# Patient Record
Sex: FEMALE | Race: WHITE | NOT HISPANIC OR LATINO | Employment: FULL TIME | URBAN - NONMETROPOLITAN AREA
[De-identification: names, ages, dates, MRNs, and addresses within clinical notes are randomized per-mention and may not be internally consistent; named-entity substitution may affect disease eponyms.]

---

## 2018-06-29 ENCOUNTER — OFFICE VISIT CONVERTED (OUTPATIENT)
Dept: FAMILY MEDICINE CLINIC | Age: 43
End: 2018-06-29
Attending: NURSE PRACTITIONER

## 2018-09-28 ENCOUNTER — OFFICE VISIT CONVERTED (OUTPATIENT)
Dept: FAMILY MEDICINE CLINIC | Age: 43
End: 2018-09-28
Attending: NURSE PRACTITIONER

## 2021-05-18 NOTE — PROGRESS NOTES
Jackie Dooley 1975     Office/Outpatient Visit    Visit Date:  04:17 pm    Provider: Barbara Martell N.P. (Assistant: Sharon Altamirano MA)    Location: [facility]        Electronically signed by Barbara Martell N.P. on  2018 03:24:13 PM                             SUBJECTIVE:        CC:     Ms. Dooley is a 42 year old White female.  patient is here for blood work and check up;         HPI:         Ms. Dooley complains of fatigue.  States daytime fatigue. Cannot identify a cause.          Dx with insect bite to face; pt states insect bite to her L cheek. It has become red and slightly swollen. She has used otc meds without much relief.      ROS:     CONSTITUTIONAL:  Positive for fatigue.      EYES:  Negative for blurred vision.      CARDIOVASCULAR:  Negative for chest pain, orthopnea, paroxysmal nocturnal dyspnea and pedal edema.      RESPIRATORY:  Negative for dyspnea.      GASTROINTESTINAL:  Negative for abdominal pain, constipation, diarrhea, nausea and vomiting.      NEUROLOGICAL:  Negative for dizziness, headaches, paresthesias, and weakness.      PSYCHIATRIC:  Negative for anxiety, depression, and sleep disturbances.          PMH/FMH/SH:     Last Reviewed on 2017 10:37 AM by July Moura    Past Medical History:     UNREMARKABLE         GYNECOLOGICAL HISTORY:        Contraception: S/P tubal ligation;         PREVENTIVE HEALTH MAINTENANCE             EYE EXAM: was last done  normal 20/20 eye care             PAST MEDICAL HISTORY             CURRENT MEDICAL PROVIDERS:    surgeon dr cheney         Surgical History:          section: X 3;     removal right shoulder lipoma 2017;         Family History:     Unremarkable         Social History:     Occupation: OTHER (enter)warehouse;     Marital Status:      Children: 2 children         Tobacco/Alcohol/Supplements:     Last Reviewed on 2017 03:55 PM by Mariaelena Martinez     Tobacco: She has a past history of cigarette smoking; quit date:  1,21,13.          Alcohol:  Does not drink alcohol and never has.              Current Problems:     Lipoma, other specified site         Immunizations:     None        Allergies:     Last Reviewed on 1/05/2017 03:55 PM by Mariaelena Martinez      No Known Drug Allergies.         Current Medications:     Last Reviewed on 1/05/2017 03:56 PM by Mariaelena Martinez    None        OBJECTIVE:        Vitals:         Current: 6/29/2018 4:19:33 PM    Ht:  5 ft, 10 in;  Wt: 261.2 lbs;  BMI: 37.5    T: 99.6 F (oral);  BP: 115/72 mm Hg (left arm, standing);  P: 96 bpm (left arm (BP Cuff), standing);  sCr: 0.82 mg/dL;  GFR: 120.45        Exams:     PHYSICAL EXAM:     GENERAL: vital signs recorded - well developed, well nourished;  no apparent distress;     EYES: extraocular movements intact; conjunctiva and cornea are normal; PERRLA;     NECK: range of motion is normal; thyroid is non-palpable;     RESPIRATORY: normal respiratory rate and pattern with no distress; normal breath sounds with no rales, rhonchi, wheezes or rubs;     CARDIOVASCULAR: normal rate; rhythm is regular;  no systolic murmur; no edema;     GASTROINTESTINAL: nontender; normal bowel sounds; no organomegaly;     BREAST/INTEGUMENT: L cheeck with small elevated nodule, erythema.      NEUROLOGICAL:  cranial nerves, motor and sensory function, reflexes, gait and coordination are all intact;     PSYCHIATRIC:  appropriate affect and demeanor; normal speech pattern; grossly normal memory;         Procedures:     Insect bite to face     1. Kenalog 60 mg given IM in the right hip; administered by ;  lot number [id number]; expires 10/19             ASSESSMENT:           780.79   R53.83  Fatigue              DDx:     910.4   S00.86XA  Insect bite to face              DDx:         ORDERS:         Lab Orders:       21622  [facility] CBC with 3 part diff  (Send-Out)         39090  COMP - [facility] Comp. Metabolic Panel   (Send-Out)         72972  LPDP - [facility] Lipid Panel  (Send-Out)         82133  TSH - [facility] TSH  (Send-Out)         APPTO  Appointment need  (In-House)           Other Orders:       06166  Therapeutic injection  (In-House)           Kenalog, per 10 mg (x6)                 PLAN:          Fatigue     LABORATORY:  Labs ordered to be performed today include CBC, Comprehensive metabolic panel, lipid panel, and TSH.      FOLLOW-UP: Schedule a follow-up visit in 3 months..   appointment to be scheduled with July for Annual Checkup           Orders:       64558  BDCBC - [facility] CBC with 3 part diff  (Send-Out)         72441  COMP - [facility] Comp. Metabolic Panel  (Send-Out)         58811  LPDP - [facility] Lipid Panel  (Send-Out)         07761  TSH - [facility] TSH  (Send-Out)         APPTO  Appointment need  (In-House)            Insect bite to face     Steroids Kenalog 60 mg 1.5 ml           Orders:       31029  Therapeutic injection  (In-House)                     Kenalog, per 10 mg (x6)             Patient Recommendations:        For  Fatigue:     Schedule a follow-up visit in 3 months.                APPOINTMENT INFORMATION:        Monday Tuesday Wednesday Thursday Friday Saturday Sunday            Time:___________________AM  PM   Date:_____________________             CHARGE CAPTURE:           Primary Diagnosis:     780.79 Fatigue            R53.83    Other fatigue              Orders:          28680   Office/outpatient visit; established patient, level 3  (In-House)             APPTO   Appointment need  (In-House)           910.4 Insect bite to face            S00.86XA    Insect bite (nonvenomous) of other part of head, initial encounter              Orders:          14804   Therapeutic injection  (In-House)                                           Kenalog, per 10 mg (x6)

## 2021-05-18 NOTE — PROGRESS NOTES
Jackie Dooley 1975     Office/Outpatient Visit    Visit Date: Fri, Sep 28, 2018 04:21 pm    Provider: Barbara Martell N.P. (Assistant: Rosita Garcia MA)    Location: [facility]        Electronically signed by Barbara Martell N.P. on  10/01/2018 08:55:02 AM                             SUBJECTIVE:        CC:     Ms. Dooley is a 42 year old White female.  Patient is here for follow up visit.;         HPI:         Joint pain, other specified site noted.  Pt states noticing joint pains for a few months. States sometimes her legs will cramp, knees, hands and hips. Has taken otc meds with little relief.          With regard to the depression with anxiety, pt states doing well on med. Here for f/u and refills.          Concerning fatigue, other details: States daytime fatigue with no identified cause..          Vit D deficiency: pt states not currently supplementing. Checking lab today.      ROS:     CONSTITUTIONAL:  Positive for fatigue.      EYES:  Negative for blurred vision.      CARDIOVASCULAR:  Negative for chest pain, orthopnea, paroxysmal nocturnal dyspnea and pedal edema.      RESPIRATORY:  Negative for dyspnea.      GASTROINTESTINAL:  Negative for abdominal pain, constipation, diarrhea, nausea and vomiting.      NEUROLOGICAL:  Negative for dizziness, headaches, paresthesias, and weakness.      PSYCHIATRIC:  Positive for depression.          PMH/FMH/SH:     Last Reviewed on 2018 05:20 PM by Barbara Martell    Past Medical History:     UNREMARKABLE         GYNECOLOGICAL HISTORY:        Contraception: S/P tubal ligation;         PREVENTIVE HEALTH MAINTENANCE             EYE EXAM: was last done  normal 20/20 eye care             PAST MEDICAL HISTORY             CURRENT MEDICAL PROVIDERS:    surgeon dr cheney         Surgical History:          section: X 3;     removal right shoulder lipoma 2017;         Family History:     Unremarkable         Social  History:     Occupation: OTHER (enter)warehouse;     Marital Status:      Children: 2 children         Tobacco/Alcohol/Supplements:     Last Reviewed on 9/28/2018 05:20 PM by Barbara Martell    Tobacco: She has a past history of cigarette smoking; quit date:  1,21,13.          Alcohol:  Does not drink alcohol and never has.          Substance Abuse History:     Last Reviewed on 9/28/2018 05:20 PM by Barbara Martell        Mental Health History:     Last Reviewed on 9/28/2018 05:20 PM by Barbara Martell        Communicable Diseases (eg STDs):     Last Reviewed on 9/28/2018 05:20 PM by Barbara Martell            Current Problems:     Last Reviewed on 9/28/2018 05:20 PM by Barbara Martell    Fatigue     Lipoma, other specified site         Immunizations:     None        Allergies:     Last Reviewed on 9/28/2018 05:20 PM by Barbara Martell      No Known Drug Allergies.         Current Medications:     Last Reviewed on 9/28/2018 05:20 PM by Barbara Martell    Multivitamin/Mineral Supplement Capsules Take 1 capsule(s) by mouth daily         OBJECTIVE:        Vitals:         Current: 9/28/2018 4:25:46 PM    Ht:  5 ft, 10 in;  Wt: 265.6 lbs;  BMI: 38.1    T: 98.5 F (oral);  BP: 119/43 mm Hg (left arm, standing);  P: 77 bpm (left arm (BP Cuff), standing);  sCr: 0.98 mg/dL;  GFR: 101.50        Exams:     PHYSICAL EXAM:     GENERAL: vital signs recorded - well developed, well nourished;  no apparent distress;     EYES: extraocular movements intact; conjunctiva and cornea are normal; PERRLA;     NECK: range of motion is normal; thyroid is non-palpable;     RESPIRATORY: normal respiratory rate and pattern with no distress; normal breath sounds with no rales, rhonchi, wheezes or rubs;     CARDIOVASCULAR: normal rate; rhythm is regular;  no systolic murmur; no edema;     GASTROINTESTINAL: nontender; normal bowel sounds; no organomegaly;     MUSCULOSKELETAL:  Normal range of motion, strength and tone;      NEUROLOGICAL:  cranial nerves, motor and sensory function, reflexes, gait and coordination are all intact;     PSYCHIATRIC:  appropriate affect and demeanor; normal speech pattern; grossly normal memory;         ASSESSMENT:           719.48   M25.562   M25.561   M79.642   M25.541  Joint pain, other specified site              DDx:     300.4   F34.1  Depression with anxiety              DDx:     V77.91   Z13.220  Screening for hyperlipidemia              DDx:     780.79   R53.83  Fatigue              DDx:     268.9   E55.9  Vitamin D deficiency, unspecified              DDx:         ORDERS:         Meds Prescribed:       Lexapro (Escitalopram Oxalate) 10mg Tablet 1 tab daily  #30 (Thirty) tablet(s) Refills: 2         Lab Orders:       FUTURE  Future order to be done at patients convenience  (Send-Out)         82[facility]  [facility] with 3 part diff  (Send-Out)         14664  COMP - [facility] Comp. Metabolic Panel  (Send-Out)         52866  THYII - [facility] Thyroid panel with TSH (23729, 15708)  (Send-Out)         93478  LPDP - [facility] Lipid Panel  (Send-Out)         57897  B12FO - [facility] Vitamin B12 with Folate  (Send-Out)         66379  VITD - [facility] Vitamin D, 25 Hydroxy  (Send-Out)         76864  FSH - [facility] Follicle stimulating hormone  (Send-Out)         02421  LH - [facility] Luteinizing hormone (LH)  (Send-Out)                   PLAN:          Joint pain, other specified site         FOLLOW-UP TESTING #1: FOLLOW-UP LABORATORY:  Labs to be scheduled in the future include CBC, CMP, and Thyroid Panel.            Orders:       FUTURE  Future order to be done at patients convenience  (Send-Out)         09688  BDCB - [facility] CBC with 3 part diff  (Send-Out)         69983  COMP - [facility] Comp. Metabolic Panel  (Send-Out)         33699  THYII - [facility] Thyroid panel with TSH (09476, 84951)  (Send-Out)            Depression with anxiety           Prescriptions:       Lexapro (Escitalopram Oxalate) 10mg Tablet 1 tab daily  #30 (Thirty) tablet(s) Refills: 2           Screening for hyperlipidemia           Orders:       14663  LPDP - [facility] Lipid Panel  (Send-Out)            Fatigue         FOLLOW-UP TESTING #1: FOLLOW-UP LABORATORY:  Labs to be scheduled in the future include FSH, Luteinizing hormone, and Vitamin B12 with Folate.            Orders:       96885  B12FO - [facility] Vitamin B12 with Folate  (Send-Out)         20233  FSH - [facility] Follicle stimulating hormone  (Send-Out)         27464  LH - [facility] Luteinizing hormone (LH)  (Send-Out)            Vitamin D deficiency, unspecified           Orders:       18424  VITD - [facility] Vitamin D, 25 Hydroxy  (Send-Out)               Patient Recommendations:        For  Joint pain, other specified site:             The following laboratory testing has been ordered: CBC metabolic panel, comprehensive         For  Fatigue:             The following laboratory testing has been ordered:             CHARGE CAPTURE:           Primary Diagnosis:     719.48 Joint pain, other specified site            M25.562    Pain in left knee           M25.561    Pain in right knee           M79.642    Pain in left hand           M25.541    Pain in joints of right hand              Orders:          33617   Office/outpatient visit; established patient, level 4  (In-House)           300.4 Depression with anxiety            F34.1    Dysthymic disorder    V77.91 Screening for hyperlipidemia            Z13.220    Encounter for screening for lipoid disorders    780.79 Fatigue            R53.83    Other fatigue    268.9 Vitamin D deficiency, unspecified            E55.9    Vitamin D deficiency, unspecified

## 2021-07-01 VITALS
BODY MASS INDEX: 37.39 KG/M2 | TEMPERATURE: 99.6 F | HEIGHT: 70 IN | SYSTOLIC BLOOD PRESSURE: 115 MMHG | HEART RATE: 96 BPM | WEIGHT: 261.2 LBS | DIASTOLIC BLOOD PRESSURE: 72 MMHG

## 2021-07-01 VITALS
BODY MASS INDEX: 38.02 KG/M2 | WEIGHT: 265.6 LBS | HEIGHT: 70 IN | DIASTOLIC BLOOD PRESSURE: 43 MMHG | HEART RATE: 77 BPM | SYSTOLIC BLOOD PRESSURE: 119 MMHG | TEMPERATURE: 98.5 F

## 2021-10-20 ENCOUNTER — OFFICE VISIT (OUTPATIENT)
Dept: FAMILY MEDICINE CLINIC | Age: 46
End: 2021-10-20

## 2021-10-20 VITALS
BODY MASS INDEX: 35.59 KG/M2 | WEIGHT: 248.6 LBS | HEART RATE: 96 BPM | TEMPERATURE: 97.4 F | DIASTOLIC BLOOD PRESSURE: 73 MMHG | SYSTOLIC BLOOD PRESSURE: 118 MMHG | HEIGHT: 70 IN

## 2021-10-20 DIAGNOSIS — B37.31 YEAST VAGINITIS: ICD-10-CM

## 2021-10-20 DIAGNOSIS — Z11.59 ENCOUNTER FOR SCREENING FOR VIRAL DISEASE: ICD-10-CM

## 2021-10-20 DIAGNOSIS — Z00.00 WELLNESS EXAMINATION: Primary | ICD-10-CM

## 2021-10-20 DIAGNOSIS — Z23 ENCOUNTER FOR IMMUNIZATION: ICD-10-CM

## 2021-10-20 PROCEDURE — 90472 IMMUNIZATION ADMIN EACH ADD: CPT | Performed by: NURSE PRACTITIONER

## 2021-10-20 PROCEDURE — G0123 SCREEN CERV/VAG THIN LAYER: HCPCS | Performed by: NURSE PRACTITIONER

## 2021-10-20 PROCEDURE — 99396 PREV VISIT EST AGE 40-64: CPT | Performed by: NURSE PRACTITIONER

## 2021-10-20 PROCEDURE — 90686 IIV4 VACC NO PRSV 0.5 ML IM: CPT | Performed by: NURSE PRACTITIONER

## 2021-10-20 PROCEDURE — 90471 IMMUNIZATION ADMIN: CPT | Performed by: NURSE PRACTITIONER

## 2021-10-20 PROCEDURE — 90715 TDAP VACCINE 7 YRS/> IM: CPT | Performed by: NURSE PRACTITIONER

## 2021-10-20 RX ORDER — FLUCONAZOLE 150 MG/1
150 TABLET ORAL ONCE
Qty: 1 TABLET | Refills: 0 | Status: SHIPPED | OUTPATIENT
Start: 2021-10-20 | End: 2021-10-20

## 2021-10-20 RX ORDER — MULTIPLE VITAMINS W/ MINERALS TAB 9MG-400MCG
1 TAB ORAL DAILY
COMMUNITY

## 2021-10-20 NOTE — PROGRESS NOTES
Chief Complaint  Gynecologic Exam    Subjective    Patient is a 45 year old female in today for annual physical and well woman exam.         Jackie Dooley presents to [facility]  Gynecologic Exam  The patient's primary symptoms include genital itching and vaginal discharge. The patient's pertinent negatives include no genital odor or pelvic pain. This is a new problem. The current episode started in the past 7 days. The problem has been unchanged. The patient is experiencing no pain. Pertinent negatives include no abdominal pain, chills, constipation, diarrhea, dysuria, fever, painful intercourse or urgency. The vaginal discharge was white. There has been no bleeding. Nothing aggravates the symptoms. She has tried nothing for the symptoms. She is sexually active. Her menstrual history has been regular.           No past medical history on file.    No Known Allergies     No past surgical history on file.    Social History     Socioeconomic History   • Marital status:    Tobacco Use   • Smoking status: Current Every Day Smoker     Packs/day: 0.50     Types: Cigarettes   • Smokeless tobacco: Never Used       History reviewed. No pertinent family history.     Last Completed Pap Smear          PAP SMEAR (Every 3 Years) Next due on 10/20/2024    10/20/2021  Done                Last Completed Mammogram     This patient has no relevant Health Maintenance data.          Last Completed Colonoscopy     This patient has no relevant Health Maintenance data.            Current Outpatient Medications:   •  multivitamin with minerals tablet tablet, Take 1 tablet by mouth Daily., Disp: , Rfl:   •  fluconazole (Diflucan) 150 MG tablet, Take 1 tablet by mouth 1 (One) Time for 1 dose., Disp: 1 tablet, Rfl: 0    There are no discontinued medications.    Immunization History   Administered Date(s) Administered   • FluLaval/Fluarix/Fluzone >6 10/20/2021   • Tdap 10/20/2021       Review of  "Systems   Constitutional: Negative.  Negative for chills and fever.   HENT: Negative.  Negative for dental problem and sinus pressure.    Eyes: Negative.    Respiratory: Negative.  Negative for cough and shortness of breath.    Cardiovascular: Negative.  Negative for chest pain and leg swelling.   Gastrointestinal: Negative.  Negative for abdominal pain, constipation and diarrhea.   Endocrine: Negative.    Genitourinary: Positive for vaginal discharge. Negative for breast discharge, breast lump, breast pain, dysuria, pelvic pain and urgency.   Musculoskeletal: Negative.    Skin: Negative.    Allergic/Immunologic: Negative.    Neurological: Positive for dizziness and light-headedness.   Hematological: Negative.    Psychiatric/Behavioral: The patient is nervous/anxious.         Objective     Vitals:    10/20/21 1526   BP: 118/73   BP Location: Left arm   Patient Position: Sitting   Pulse: 96   Temp: 97.4 °F (36.3 °C)   TempSrc: Oral   Weight: 113 kg (248 lb 9.6 oz)   Height: 177.8 cm (70\")     Body mass index is 35.67 kg/m².         Physical Exam  HENT:      Head: Normocephalic.      Right Ear: Tympanic membrane, ear canal and external ear normal.      Left Ear: Tympanic membrane, ear canal and external ear normal.      Nose: Nose normal.      Mouth/Throat:      Mouth: Mucous membranes are moist.      Pharynx: Oropharynx is clear.   Eyes:      Conjunctiva/sclera: Conjunctivae normal.      Pupils: Pupils are equal, round, and reactive to light.   Cardiovascular:      Rate and Rhythm: Normal rate and regular rhythm.      Pulses: Normal pulses.      Heart sounds: Normal heart sounds.   Pulmonary:      Effort: Pulmonary effort is normal.      Breath sounds: Normal breath sounds.   Chest:   Breasts: Breasts are symmetrical.      Right: Normal. No supraclavicular adenopathy.      Left: Normal. No supraclavicular adenopathy.       Abdominal:      General: Abdomen is flat. Bowel sounds are normal.      Palpations: Abdomen is " soft.   Genitourinary:     General: Normal vulva.      Vagina: Vaginal discharge and erythema present.      Cervix: Discharge present.      Rectum: Guaiac result negative.   Musculoskeletal:         General: Normal range of motion.      Cervical back: Normal range of motion.   Lymphadenopathy:      Upper Body:      Right upper body: No supraclavicular adenopathy.      Left upper body: No supraclavicular adenopathy.   Skin:     General: Skin is warm and dry.      Capillary Refill: Capillary refill takes less than 2 seconds.   Neurological:      Mental Status: She is alert and oriented to person, place, and time.      Cranial Nerves: Cranial nerves are intact.      Sensory: Sensation is intact.      Motor: Motor function is intact.      Coordination: Coordination is intact.      Gait: Gait is intact.   Psychiatric:         Attention and Perception: Attention normal.         Mood and Affect: Mood normal.         Speech: Speech normal.         Behavior: Behavior normal. Behavior is cooperative.         Thought Content: Thought content normal.         Cognition and Memory: Cognition and memory normal.         Judgment: Judgment normal.             Result Review :                           Assessment and Plan        Diagnoses and all orders for this visit:    1. Wellness examination (Primary)  -     Comprehensive Metabolic Panel  -     CBC & Differential  -     TSH  -     Lipid Panel  -     PAP, Liquid Based ([facility] Only); Future  -     PAP, Liquid Based ([facility] Only)    2. Yeast vaginitis  -     fluconazole (Diflucan) 150 MG tablet; Take 1 tablet by mouth 1 (One) Time for 1 dose.  Dispense: 1 tablet; Refill: 0    3. Encounter for immunization  -     FluLaval/Fluarix/Fluzone >6 Months (1299-2131)  -     Tdap Vaccine Greater Than or Equal To 6yo IM    4. Encounter for screening for viral disease  -     Hepatitis C antibody; Future                Follow Up {Instructions Charge Capture  Follow-up Communications  :23}    Return in about 1 year (around 10/20/2022), or if symptoms worsen or fail to improve.    Patient was given instructions and counseling regarding her condition or for health maintenance advice. Please see specific information pulled into the AVS if appropriate.     Jackie Dooley  reports that she has been smoking cigarettes. She has been smoking about 0.50 packs per day. She has never used smokeless tobacco.. I have educated her on the risk of diseases from using tobacco products such as cancer, COPD and heart disease.     I advised her to quit and she is not willing to quit.    I spent 3  minutes counseling the patient.

## 2021-10-25 ENCOUNTER — LAB (OUTPATIENT)
Dept: LAB | Facility: HOSPITAL | Age: 46
End: 2021-10-25

## 2021-10-25 DIAGNOSIS — Z11.59 ENCOUNTER FOR SCREENING FOR VIRAL DISEASE: ICD-10-CM

## 2021-10-25 LAB
ALBUMIN SERPL-MCNC: 3.9 G/DL (ref 3.5–5.2)
ALBUMIN/GLOB SERPL: 1.2 G/DL
ALP SERPL-CCNC: 94 U/L (ref 39–117)
ALT SERPL W P-5'-P-CCNC: 15 U/L (ref 1–33)
ANION GAP SERPL CALCULATED.3IONS-SCNC: 6.1 MMOL/L (ref 5–15)
AST SERPL-CCNC: 19 U/L (ref 1–32)
BASOPHILS # BLD AUTO: 0.06 10*3/MM3 (ref 0–0.2)
BASOPHILS NFR BLD AUTO: 0.8 % (ref 0–1.5)
BILIRUB SERPL-MCNC: <0.2 MG/DL (ref 0–1.2)
BUN SERPL-MCNC: 13 MG/DL (ref 6–20)
BUN/CREAT SERPL: 17.3 (ref 7–25)
CALCIUM SPEC-SCNC: 9.2 MG/DL (ref 8.6–10.5)
CHLORIDE SERPL-SCNC: 107 MMOL/L (ref 98–107)
CHOLEST SERPL-MCNC: 135 MG/DL (ref 0–200)
CO2 SERPL-SCNC: 28.9 MMOL/L (ref 22–29)
CREAT SERPL-MCNC: 0.75 MG/DL (ref 0.57–1)
DEPRECATED RDW RBC AUTO: 42.5 FL (ref 37–54)
EOSINOPHIL # BLD AUTO: 0.32 10*3/MM3 (ref 0–0.4)
EOSINOPHIL NFR BLD AUTO: 4.1 % (ref 0.3–6.2)
ERYTHROCYTE [DISTWIDTH] IN BLOOD BY AUTOMATED COUNT: 12.6 % (ref 12.3–15.4)
GFR SERPL CREATININE-BSD FRML MDRD: 84 ML/MIN/1.73
GLOBULIN UR ELPH-MCNC: 3.3 GM/DL
GLUCOSE SERPL-MCNC: 96 MG/DL (ref 65–99)
HCT VFR BLD AUTO: 39 % (ref 34–46.6)
HCV AB SER DONR QL: NORMAL
HDLC SERPL-MCNC: 44 MG/DL (ref 40–60)
HGB BLD-MCNC: 12.6 G/DL (ref 12–15.9)
IMM GRANULOCYTES # BLD AUTO: 0.02 10*3/MM3 (ref 0–0.05)
IMM GRANULOCYTES NFR BLD AUTO: 0.3 % (ref 0–0.5)
LDLC SERPL CALC-MCNC: 75 MG/DL (ref 0–100)
LDLC/HDLC SERPL: 1.7 {RATIO}
LYMPHOCYTES # BLD AUTO: 2.22 10*3/MM3 (ref 0.7–3.1)
LYMPHOCYTES NFR BLD AUTO: 28.8 % (ref 19.6–45.3)
MCH RBC QN AUTO: 29.5 PG (ref 26.6–33)
MCHC RBC AUTO-ENTMCNC: 32.3 G/DL (ref 31.5–35.7)
MCV RBC AUTO: 91.3 FL (ref 79–97)
MONOCYTES # BLD AUTO: 0.51 10*3/MM3 (ref 0.1–0.9)
MONOCYTES NFR BLD AUTO: 6.6 % (ref 5–12)
NEUTROPHILS NFR BLD AUTO: 4.59 10*3/MM3 (ref 1.7–7)
NEUTROPHILS NFR BLD AUTO: 59.4 % (ref 42.7–76)
PLATELET # BLD AUTO: 284 10*3/MM3 (ref 140–450)
PMV BLD AUTO: 10.5 FL (ref 6–12)
POTASSIUM SERPL-SCNC: 4.3 MMOL/L (ref 3.5–5.2)
PROT SERPL-MCNC: 7.2 G/DL (ref 6–8.5)
RBC # BLD AUTO: 4.27 10*6/MM3 (ref 3.77–5.28)
SODIUM SERPL-SCNC: 142 MMOL/L (ref 136–145)
TRIGL SERPL-MCNC: 82 MG/DL (ref 0–150)
TSH SERPL DL<=0.05 MIU/L-ACNC: 0.5 UIU/ML (ref 0.27–4.2)
VLDLC SERPL-MCNC: 16 MG/DL (ref 5–40)
WBC # BLD AUTO: 7.72 10*3/MM3 (ref 3.4–10.8)

## 2021-10-25 PROCEDURE — 85025 COMPLETE CBC W/AUTO DIFF WBC: CPT | Performed by: NURSE PRACTITIONER

## 2021-10-25 PROCEDURE — 80053 COMPREHEN METABOLIC PANEL: CPT | Performed by: NURSE PRACTITIONER

## 2021-10-25 PROCEDURE — 86803 HEPATITIS C AB TEST: CPT

## 2021-10-25 PROCEDURE — 84443 ASSAY THYROID STIM HORMONE: CPT | Performed by: NURSE PRACTITIONER

## 2021-10-25 PROCEDURE — 80061 LIPID PANEL: CPT | Performed by: NURSE PRACTITIONER

## 2021-10-25 PROCEDURE — 36415 COLL VENOUS BLD VENIPUNCTURE: CPT

## 2021-10-26 LAB
CYTOLOGIST CVX/VAG CYTO: NORMAL
CYTOLOGY CVX/VAG DOC CYTO: NORMAL
DX ICD CODE: NORMAL
HIV 1 & 2 AB SER-IMP: NORMAL
OTHER STN SPEC: NORMAL
STAT OF ADQ CVX/VAG CYTO-IMP: NORMAL

## 2022-07-25 ENCOUNTER — OFFICE VISIT (OUTPATIENT)
Dept: FAMILY MEDICINE CLINIC | Age: 47
End: 2022-07-25

## 2022-07-25 VITALS
HEART RATE: 66 BPM | HEIGHT: 70 IN | DIASTOLIC BLOOD PRESSURE: 72 MMHG | BODY MASS INDEX: 36.08 KG/M2 | SYSTOLIC BLOOD PRESSURE: 98 MMHG | OXYGEN SATURATION: 97 % | WEIGHT: 252 LBS

## 2022-07-25 DIAGNOSIS — R05.9 COUGH: ICD-10-CM

## 2022-07-25 DIAGNOSIS — U07.1 COVID: Primary | ICD-10-CM

## 2022-07-25 LAB
EXPIRATION DATE: ABNORMAL
FLUAV AG UPPER RESP QL IA.RAPID: NOT DETECTED
FLUBV AG UPPER RESP QL IA.RAPID: NOT DETECTED
INTERNAL CONTROL: ABNORMAL
Lab: ABNORMAL
SARS-COV-2 AG UPPER RESP QL IA.RAPID: DETECTED

## 2022-07-25 PROCEDURE — 87428 SARSCOV & INF VIR A&B AG IA: CPT | Performed by: NURSE PRACTITIONER

## 2022-07-25 PROCEDURE — 99213 OFFICE O/P EST LOW 20 MIN: CPT | Performed by: NURSE PRACTITIONER

## 2022-07-25 RX ORDER — DEXTROMETHORPHAN HYDROBROMIDE AND PROMETHAZINE HYDROCHLORIDE 15; 6.25 MG/5ML; MG/5ML
5 SYRUP ORAL 4 TIMES DAILY PRN
Qty: 118 ML | Refills: 0 | Status: SHIPPED | OUTPATIENT
Start: 2022-07-25 | End: 2022-08-04

## 2022-07-25 RX ORDER — AZITHROMYCIN 250 MG/1
TABLET, FILM COATED ORAL
Qty: 6 TABLET | Refills: 0 | Status: SHIPPED | OUTPATIENT
Start: 2022-07-25 | End: 2022-08-04

## 2022-07-25 NOTE — PROGRESS NOTES
"Chief Complaint  Sore Throat and Nasal Congestion    Subjective        Jackie Dooley presents to [facility]  Sore Throat   This is a new problem. The current episode started 1 to 4 weeks ago. The problem has been gradually improving. Associated symptoms include abdominal pain, congestion, coughing, ear pain, a hoarse voice, a plugged ear sensation and shortness of breath. Pertinent negatives include no headaches. She has had no exposure to strep. She has tried nothing for the symptoms. The treatment provided no relief.       Objective   Vital Signs:  BP 98/72   Pulse 66   Ht 177.8 cm (70\")   Wt 114 kg (252 lb)   SpO2 97%   BMI 36.16 kg/m²   Estimated body mass index is 36.16 kg/m² as calculated from the following:    Height as of this encounter: 177.8 cm (70\").    Weight as of this encounter: 114 kg (252 lb).          Physical Exam  Constitutional:       Appearance: She is not ill-appearing.   HENT:      Head: Normocephalic.      Nose: Congestion present.   Cardiovascular:      Rate and Rhythm: Normal rate and regular rhythm.   Pulmonary:      Effort: Pulmonary effort is normal. No respiratory distress.      Breath sounds: Normal breath sounds. No stridor. No wheezing, rhonchi or rales.   Skin:     General: Skin is warm and dry.   Neurological:      Mental Status: She is alert and oriented to person, place, and time.   Psychiatric:         Mood and Affect: Mood normal.        Result Review :                 Results for orders placed or performed in visit on 07/25/22   POCT SARS-CoV-2 Antigen MARY + Flu    Specimen: Swab   Result Value Ref Range    SARS Antigen Detected (A) Not Detected, Presumptive Negative    Influenza A Antigen MARY Not Detected Not Detected    Influenza B Antigen MARY Not Detected Not Detected    Internal Control Passed Passed    Lot Number [id number]     Expiration Date 01/29/23        Assessment and Plan   Diagnoses and all orders for this visit:    1. COVID " (Primary)  -     azithromycin (Zithromax Z-Curt) 250 MG tablet; Take 2 tablets by mouth on day 1, then 1 tablet daily on days 2-5  Dispense: 6 tablet; Refill: 0    2. Cough  -     promethazine-dextromethorphan (PROMETHAZINE-DM) 6.25-15 MG/5ML syrup; Take 5 mL by mouth 4 (Four) Times a Day As Needed for Cough.  Dispense: 118 mL; Refill: 0  -     POCT SARS-CoV-2 Antigen MARY + Flu             Follow Up   Return if symptoms worsen or fail to improve.  Patient was given instructions and counseling regarding her condition or for health maintenance advice. Please see specific information pulled into the AVS if appropriate.

## 2022-08-04 ENCOUNTER — OFFICE VISIT (OUTPATIENT)
Dept: FAMILY MEDICINE CLINIC | Age: 47
End: 2022-08-04

## 2022-08-04 ENCOUNTER — LAB (OUTPATIENT)
Dept: LAB | Facility: HOSPITAL | Age: 47
End: 2022-08-04

## 2022-08-04 VITALS
TEMPERATURE: 98.7 F | HEIGHT: 70 IN | BODY MASS INDEX: 36.25 KG/M2 | OXYGEN SATURATION: 100 % | HEART RATE: 78 BPM | DIASTOLIC BLOOD PRESSURE: 70 MMHG | WEIGHT: 253.2 LBS | SYSTOLIC BLOOD PRESSURE: 115 MMHG

## 2022-08-04 DIAGNOSIS — F41.9 ANXIETY: Primary | ICD-10-CM

## 2022-08-04 DIAGNOSIS — R42 EPISODIC LIGHTHEADEDNESS: ICD-10-CM

## 2022-08-04 DIAGNOSIS — R73.09 ELEVATED RANDOM BLOOD GLUCOSE LEVEL: ICD-10-CM

## 2022-08-04 LAB
ALBUMIN SERPL-MCNC: 4 G/DL (ref 3.5–5.2)
ALBUMIN/GLOB SERPL: 1.1 G/DL
ALP SERPL-CCNC: 94 U/L (ref 39–117)
ALT SERPL W P-5'-P-CCNC: 20 U/L (ref 1–33)
ANION GAP SERPL CALCULATED.3IONS-SCNC: 10 MMOL/L (ref 5–15)
AST SERPL-CCNC: 21 U/L (ref 1–32)
BASOPHILS # BLD AUTO: 0.07 10*3/MM3 (ref 0–0.2)
BASOPHILS NFR BLD AUTO: 0.7 % (ref 0–1.5)
BILIRUB SERPL-MCNC: 0.4 MG/DL (ref 0–1.2)
BUN SERPL-MCNC: 15 MG/DL (ref 6–20)
BUN/CREAT SERPL: 13.3 (ref 7–25)
CALCIUM SPEC-SCNC: 9.5 MG/DL (ref 8.6–10.5)
CHLORIDE SERPL-SCNC: 104 MMOL/L (ref 98–107)
CO2 SERPL-SCNC: 27 MMOL/L (ref 22–29)
CREAT SERPL-MCNC: 1.13 MG/DL (ref 0.57–1)
DEPRECATED RDW RBC AUTO: 40.5 FL (ref 37–54)
EGFRCR SERPLBLD CKD-EPI 2021: 61.3 ML/MIN/1.73
EOSINOPHIL # BLD AUTO: 0.17 10*3/MM3 (ref 0–0.4)
EOSINOPHIL NFR BLD AUTO: 1.6 % (ref 0.3–6.2)
ERYTHROCYTE [DISTWIDTH] IN BLOOD BY AUTOMATED COUNT: 12.8 % (ref 12.3–15.4)
GLOBULIN UR ELPH-MCNC: 3.7 GM/DL
GLUCOSE SERPL-MCNC: 100 MG/DL (ref 65–99)
HCT VFR BLD AUTO: 39.3 % (ref 34–46.6)
HGB BLD-MCNC: 12.7 G/DL (ref 12–15.9)
IMM GRANULOCYTES # BLD AUTO: 0.04 10*3/MM3 (ref 0–0.05)
IMM GRANULOCYTES NFR BLD AUTO: 0.4 % (ref 0–0.5)
LYMPHOCYTES # BLD AUTO: 3.03 10*3/MM3 (ref 0.7–3.1)
LYMPHOCYTES NFR BLD AUTO: 29.3 % (ref 19.6–45.3)
MCH RBC QN AUTO: 28.5 PG (ref 26.6–33)
MCHC RBC AUTO-ENTMCNC: 32.3 G/DL (ref 31.5–35.7)
MCV RBC AUTO: 88.3 FL (ref 79–97)
MONOCYTES # BLD AUTO: 0.82 10*3/MM3 (ref 0.1–0.9)
MONOCYTES NFR BLD AUTO: 7.9 % (ref 5–12)
NEUTROPHILS NFR BLD AUTO: 6.22 10*3/MM3 (ref 1.7–7)
NEUTROPHILS NFR BLD AUTO: 60.1 % (ref 42.7–76)
NRBC BLD AUTO-RTO: 0 /100 WBC (ref 0–0.2)
PLATELET # BLD AUTO: 340 10*3/MM3 (ref 140–450)
PMV BLD AUTO: 11.7 FL (ref 6–12)
POTASSIUM SERPL-SCNC: 4.2 MMOL/L (ref 3.5–5.2)
PROT SERPL-MCNC: 7.7 G/DL (ref 6–8.5)
RBC # BLD AUTO: 4.45 10*6/MM3 (ref 3.77–5.28)
SODIUM SERPL-SCNC: 141 MMOL/L (ref 136–145)
TSH SERPL DL<=0.05 MIU/L-ACNC: 0.55 UIU/ML (ref 0.27–4.2)
WBC NRBC COR # BLD: 10.35 10*3/MM3 (ref 3.4–10.8)

## 2022-08-04 PROCEDURE — 99213 OFFICE O/P EST LOW 20 MIN: CPT | Performed by: NURSE PRACTITIONER

## 2022-08-04 PROCEDURE — 80050 GENERAL HEALTH PANEL: CPT

## 2022-08-04 PROCEDURE — 36415 COLL VENOUS BLD VENIPUNCTURE: CPT

## 2022-08-04 RX ORDER — BUSPIRONE HYDROCHLORIDE 5 MG/1
5 TABLET ORAL 3 TIMES DAILY PRN
Qty: 60 TABLET | Refills: 1 | Status: SHIPPED | OUTPATIENT
Start: 2022-08-04

## 2022-08-04 NOTE — PROGRESS NOTES
"Chief Complaint  Irritable (Reports have been jimenez and would like to discuss/) and Labs (Would like to discuss getting labs done for diabetes, family history of diabetes)    Subjective        Jackie Dooley presents to [facility]  Anxiety  Presents for initial visit. Symptoms include excessive worry, insomnia, irritability and nervous/anxious behavior. Patient reports no depressed mood. Symptoms occur occasionally. The severity of symptoms is severe. The symptoms are aggravated by family issues and work stress. The quality of sleep is fair. Nighttime awakenings: occasional.     Risk factors include family history and a major life event. Past treatments include lifestyle changes. The treatment provided no relief.       Objective   Vital Signs:  /70 (BP Location: Left arm, Patient Position: Sitting, Cuff Size: Large Adult)   Pulse 78   Temp 98.7 °F (37.1 °C) (Oral)   Ht 177.8 cm (70\")   Wt 115 kg (253 lb 3.2 oz)   SpO2 100% Comment: Room air  BMI 36.33 kg/m²   Estimated body mass index is 36.33 kg/m² as calculated from the following:    Height as of this encounter: 177.8 cm (70\").    Weight as of this encounter: 115 kg (253 lb 3.2 oz).          Physical Exam  HENT:      Head: Normocephalic.      Nose: Nose normal.      Mouth/Throat:      Mouth: Mucous membranes are moist.      Pharynx: Oropharynx is clear.   Eyes:      Conjunctiva/sclera: Conjunctivae normal.      Pupils: Pupils are equal, round, and reactive to light.   Cardiovascular:      Rate and Rhythm: Normal rate and regular rhythm.   Pulmonary:      Effort: Pulmonary effort is normal.      Breath sounds: Normal breath sounds.   Abdominal:      General: Abdomen is flat. Bowel sounds are normal.      Palpations: Abdomen is soft.   Skin:     General: Skin is warm and dry.      Capillary Refill: Capillary refill takes less than 2 seconds.   Neurological:      Mental Status: She is alert and oriented to person, " place, and time.   Psychiatric:         Attention and Perception: Attention normal.         Mood and Affect: Mood normal.         Speech: Speech normal.         Behavior: Behavior normal. Behavior is cooperative.         Thought Content: Thought content does not include suicidal plan.        Result Review :                Assessment and Plan   Diagnoses and all orders for this visit:    1. Anxiety (Primary)  Assessment & Plan:  Worsening. Discussed coping and lifestyle modifications. Started on Buspar, follow up in three months to evaluate effectiveness. Call for sooner appointment for worsening.     Orders:  -     busPIRone (BUSPAR) 5 MG tablet; Take 1 tablet by mouth 3 (Three) Times a Day As Needed (Anxiety).  Dispense: 60 tablet; Refill: 1    2. Episodic lightheadedness  -     CBC w AUTO Differential; Future  -     Comprehensive metabolic panel; Future  -     TSH Rfx On Abnormal To Free T4; Future           Follow Up   Return if symptoms worsen or fail to improve.  Patient was given instructions and counseling regarding her condition or for health maintenance advice. Please see specific information pulled into the AVS if appropriate.

## 2022-08-09 PROBLEM — E66.9 CLASS 1 OBESITY: Status: ACTIVE | Noted: 2022-08-09

## 2022-08-09 PROBLEM — F41.9 ANXIETY: Status: ACTIVE | Noted: 2022-08-09

## 2022-08-09 NOTE — ASSESSMENT & PLAN NOTE
Worsening. Discussed coping and lifestyle modifications. Started on Buspar, follow up in three months to evaluate effectiveness. Call for sooner appointment for worsening.

## 2022-08-11 NOTE — PROGRESS NOTES
Complete blood count normal, no signs of infection or anemia at this time.    Thyroid stimulating hormone normal.     Glucose elevated. Due to occasional lightheadedness, I added an A1C. She can come to the lab at her next earliest convenience to have that draw. Minimal NSAID use due to elevated kidney function.

## 2022-08-25 ENCOUNTER — LAB (OUTPATIENT)
Dept: LAB | Facility: HOSPITAL | Age: 47
End: 2022-08-25

## 2022-08-25 DIAGNOSIS — R73.09 ELEVATED RANDOM BLOOD GLUCOSE LEVEL: ICD-10-CM

## 2022-08-25 LAB — HBA1C MFR BLD: 5.4 % (ref 4.8–5.6)

## 2022-08-25 PROCEDURE — 36415 COLL VENOUS BLD VENIPUNCTURE: CPT

## 2022-08-25 PROCEDURE — 83036 HEMOGLOBIN GLYCOSYLATED A1C: CPT

## 2023-08-31 ENCOUNTER — OFFICE VISIT (OUTPATIENT)
Dept: FAMILY MEDICINE CLINIC | Age: 48
End: 2023-08-31
Payer: COMMERCIAL

## 2023-08-31 VITALS
BODY MASS INDEX: 39.17 KG/M2 | TEMPERATURE: 98.1 F | HEIGHT: 70 IN | DIASTOLIC BLOOD PRESSURE: 67 MMHG | WEIGHT: 273.6 LBS | SYSTOLIC BLOOD PRESSURE: 118 MMHG | HEART RATE: 87 BPM

## 2023-08-31 DIAGNOSIS — M54.50 ACUTE MIDLINE LOW BACK PAIN WITHOUT SCIATICA: ICD-10-CM

## 2023-08-31 DIAGNOSIS — N89.8 VAGINAL DISCHARGE: Primary | ICD-10-CM

## 2023-08-31 DIAGNOSIS — R35.0 FREQUENCY OF URINATION: ICD-10-CM

## 2023-08-31 LAB
BACTERIA UR QL AUTO: ABNORMAL /HPF
BILIRUB UR QL STRIP: NEGATIVE
CANDIDA SPECIES: NEGATIVE
CLARITY UR: CLEAR
COLOR UR: YELLOW
GARDNERELLA VAGINALIS: NEGATIVE
GLUCOSE UR STRIP-MCNC: NEGATIVE MG/DL
HGB UR QL STRIP.AUTO: ABNORMAL
KETONES UR QL STRIP: NEGATIVE
LEUKOCYTE ESTERASE UR QL STRIP.AUTO: NEGATIVE
MUCOUS THREADS URNS QL MICRO: ABNORMAL /HPF
NITRITE UR QL STRIP: NEGATIVE
PH UR STRIP.AUTO: 7 [PH] (ref 5–8)
PROT UR QL STRIP: NEGATIVE
RBC # UR STRIP: ABNORMAL /HPF
REF LAB TEST METHOD: ABNORMAL
SP GR UR STRIP: 1.02 (ref 1–1.03)
SQUAMOUS #/AREA URNS HPF: ABNORMAL /HPF
T VAGINALIS DNA VAG QL PROBE+SIG AMP: NEGATIVE
UROBILINOGEN UR QL STRIP: ABNORMAL
WBC # UR STRIP: ABNORMAL /HPF

## 2023-08-31 PROCEDURE — 87480 CANDIDA DNA DIR PROBE: CPT | Performed by: PHYSICIAN ASSISTANT

## 2023-08-31 PROCEDURE — 99213 OFFICE O/P EST LOW 20 MIN: CPT | Performed by: PHYSICIAN ASSISTANT

## 2023-08-31 PROCEDURE — 81001 URINALYSIS AUTO W/SCOPE: CPT | Performed by: PHYSICIAN ASSISTANT

## 2023-08-31 PROCEDURE — 87510 GARDNER VAG DNA DIR PROBE: CPT | Performed by: PHYSICIAN ASSISTANT

## 2023-08-31 PROCEDURE — 87660 TRICHOMONAS VAGIN DIR PROBE: CPT | Performed by: PHYSICIAN ASSISTANT

## 2023-08-31 PROCEDURE — 87086 URINE CULTURE/COLONY COUNT: CPT | Performed by: PHYSICIAN ASSISTANT

## 2023-08-31 NOTE — PROGRESS NOTES
Diagnoses and all orders for this visit:    1. Vaginal discharge (Primary)  Comments:  Labs pending. Plan to treat based on results.  Orders:  -     Gardnerella vaginalis, Trichomonas vaginalis, Candida albicans, DNA - Swab, Vagina; Future  -     Gardnerella vaginalis, Trichomonas vaginalis, Candida albicans, DNA - Swab, Vagina    2. Frequency of urination  Comments:  Urine culture pending. More suspicious for vaginal infection given history.  Orders:  -     Urinalysis With Microscopic - Urine, Clean Catch  -     Urine Culture - Urine, Urine, Clean Catch; Future    3. Acute midline low back pain without sciatica  Comments:  RICE care discussed. Unlikely related to UTI - no CVA tenderness or hematuria.            Subjective     CHIEF COMPLAINT    Chief Complaint   Patient presents with    Urinary Frequency            History of Present Illness  This is a 47-year-old female presenting to the clinic complaining of low back pain, urinary frequency, and vaginal odor.  She denies a true dysuria but has been going more often.  She has not had any fever or chills, flank pain or hematuria.  Denies any concern for STI.          Review of Systems   Constitutional:  Negative for chills and fever.   Gastrointestinal:  Negative for abdominal pain, nausea and vomiting.   Genitourinary:  Positive for frequency and vaginal discharge. Negative for dysuria, flank pain, hematuria, urgency and vaginal bleeding.   Musculoskeletal:  Positive for back pain.          History reviewed. No pertinent past medical history.         History reviewed. No pertinent surgical history.         History reviewed. No pertinent family history.         Social History     Socioeconomic History    Marital status:    Tobacco Use    Smokeless tobacco: Never   Substance and Sexual Activity    Alcohol use: Yes     Comment: seldom    Drug use: Never            No Known Allergies         Current Outpatient Medications on File Prior to Visit   Medication  "Sig Dispense Refill    multivitamin with minerals tablet tablet Take 1 tablet by mouth Daily.      [DISCONTINUED] busPIRone (BUSPAR) 5 MG tablet Take 1 tablet by mouth 3 (Three) Times a Day As Needed (Anxiety). 60 tablet 1     No current facility-administered medications on file prior to visit.            /67 (BP Location: Left arm, Patient Position: Sitting)   Pulse 87   Temp 98.1 øF (36.7 øC) (Oral)   Ht 177.8 cm (70\")   Wt 124 kg (273 lb 9.6 oz)   BMI 39.26 kg/mý          Objective     Physical Exam  Vitals and nursing note reviewed.   Constitutional:       Appearance: Normal appearance.   Cardiovascular:      Rate and Rhythm: Normal rate and regular rhythm.      Heart sounds: Normal heart sounds.   Pulmonary:      Effort: Pulmonary effort is normal.      Breath sounds: Normal breath sounds.   Abdominal:      General: There is no distension.      Palpations: Abdomen is soft.      Tenderness: There is no abdominal tenderness. There is no right CVA tenderness, left CVA tenderness or guarding.   Skin:     General: Skin is warm and dry.      Findings: No rash.   Neurological:      Mental Status: She is alert and oriented to person, place, and time.   Psychiatric:         Mood and Affect: Mood normal.         Behavior: Behavior normal.            Procedures                    Lab Results (last 24 hours)       Procedure Component Value Units Date/Time    Urinalysis With Microscopic - Urine, Clean Catch [[id number]]  (Abnormal) Collected: 08/31/23 1636    Specimen: Urine, Clean Catch Updated: 08/31/23 1659    Narrative:      The following orders were created for panel order Urinalysis With Microscopic - Urine, Clean Catch.  Procedure                               Abnormality         Status                     ---------                               -----------         ------                     Urinalysis without micro...[[id number]]  Abnormal            Final result               Urinalysis, Microscopic " ...[[id number]]  Abnormal            Final result                 Please view results for these tests on the individual orders.    Urinalysis without microscopic (no culture) - Urine, Clean Catch [[id number]]  (Abnormal) Collected: 08/31/23 1636    Specimen: Urine, Clean Catch Updated: 08/31/23 1644     Color, UA Yellow     Appearance, UA Clear     pH, UA 7.0     Specific Gravity, UA 1.025     Glucose, UA Negative     Ketones, UA Negative     Bilirubin, UA Negative     Blood, UA Trace     Protein, UA Negative     Leuk Esterase, UA Negative     Nitrite, UA Negative     Urobilinogen, UA 0.2 E.U./dL    Urinalysis, Microscopic Only - Urine, Clean Catch [[id number]]  (Abnormal) Collected: 08/31/23 1636    Specimen: Urine, Clean Catch Updated: 08/31/23 1659     RBC, UA 0-2 /HPF      WBC, UA 0-2 /HPF      Bacteria, UA Trace /HPF      Squamous Epithelial Cells, UA 0-2 /HPF      Mucus, UA Trace /HPF      Methodology Manual Light Microscopy    Gardnerella vaginalis, Trichomonas vaginalis, Candida albicans, DNA - Swab, Vagina [[id number]] Collected: 08/31/23 1706    Specimen: Swab from Vagina Updated: 08/31/23 1710                  No Radiology Exams Resulted Within Past 24 Hours                    Diagnoses and all orders for this visit:    1. Vaginal discharge (Primary)  Comments:  Labs pending. Plan to treat based on results.  Orders:  -     Gardnerella vaginalis, Trichomonas vaginalis, Candida albicans, DNA - Swab, Vagina; Future  -     Gardnerella vaginalis, Trichomonas vaginalis, Candida albicans, DNA - Swab, Vagina    2. Frequency of urination  Comments:  Urine culture pending. More suspicious for vaginal infection given history.  Orders:  -     Urinalysis With Microscopic - Urine, Clean Catch  -     Urine Culture - Urine, Urine, Clean Catch; Future    3. Acute midline low back pain without sciatica  Comments:  RICE care discussed. Unlikely related to UTI - no CVA tenderness or hematuria.             Additional Instructions  for the Follow-ups that You Need to Schedule       Gardnerella vaginalis, Trichomonas vaginalis, Candida albicans, DNA - Swab, Vagina    Aug 31, 2023 (Approximate)      Release to patient: Routine Release        Urine Culture - Urine, Urine, Clean Catch    Aug 31, 2023 (Approximate)      Release to patient: Routine Release                          FOR FULL DISCHARGE INSTRUCTIONS/COMMENTS/HANDOUTS please see the   AVS

## 2023-09-01 LAB — BACTERIA SPEC AEROBE CULT: NO GROWTH
